# Patient Record
(demographics unavailable — no encounter records)

---

## 2024-11-04 NOTE — DISCUSSION/SUMMARY
[FreeTextEntry1] : 3 year old male with strep throat - rapid strep positive. Complete 10 days of antibiotics. Use antipyretics as needed. After being on antibiotics for at least 24 hours patient less likely to spread infection. RTO as needed Discussed side effects of antibiotics including but not limited to diarrhea

## 2024-11-04 NOTE — HISTORY OF PRESENT ILLNESS
[Fever] : FEVER [___ Day(s)] : [unfilled] day(s) [Intermittent] : intermittent [Active] : active [Sick Contacts: ___] : sick contacts: [unfilled] [Acetaminophen] : acetaminophen [Ibuprofen] : ibuprofen [Last dose: _____] : last dose: [unfilled] [Headache] : no headache [Sore Throat] : sore throat [Cough] : cough [Max Temp: ____] : Max temperature: [unfilled] [Stable] : stable

## 2025-03-08 NOTE — DEVELOPMENTAL MILESTONES
[Normal Development] : Normal Development [None] : none [Goes to the bathroom and has] : goes to bathroom and has bowel movement by self [Dresses and undresses without] : dresses and undresses without much help [Plays make-believe] : plays make-believe [Uses 4-word sentences] : uses 4-word sentences [Tells a story from a book] : tells a story from a book [Climbs stairs, alternating feet] : climbs stairs, alternating feet without support [Skips on one foot] : skips on one foot [Draws a person with head and] : draws a person with head and 3 body part [Draws a simple cross] : draws a simple cross [Unbuttons medium-sized buttons] : unbuttons medium sized buttons [Grasps a pencil with thumb and] : grasps a pencil with thumb and fingers instead of fist [Draws recognizable pictures] : draws recognizable pictures

## 2025-03-08 NOTE — HISTORY OF PRESENT ILLNESS
[Mother] : mother [Fruit] : fruit [Vegetables] : vegetables [Meat] : meat [Fish] : fish [Dairy] : dairy [Vitamin] : Patient takes vitamin daily [___ stools per day] : [unfilled]  stools per day [Toilet Trained] : toilet trained [Normal] : Normal [In own bed] : In own bed [Brushing teeth] : Brushing teeth [Yes] : Patient goes to dentist yearly [Toothpaste] : Primary Fluoride Source: Toothpaste [In Pre-K] : In Pre-K [Curiosity about body] : Curiosity about body [Playtime (60 min/d)] : Playtime 60 min a day [Appropiate parent-child communication] : Appropriate parent-child communication [Child given choices] : Child given choices [Child Cooperates] : Child cooperates [Parent has appropriate responses to behavior] : Parent has appropriate responses to behavior [No] : Not at  exposure [Water heater temperature set at <120 degrees F] : Water heater temperature set at <120 degrees F [Car seat in back seat] : Car seat in back seat [Carbon Monoxide Detectors] : Carbon monoxide detectors [Supervised outdoor play] : Supervised outdoor play [Up to date] : Up to date [FreeTextEntry7] : 4 year old for his check up [NO] : No

## 2025-03-08 NOTE — PHYSICAL EXAM

## 2025-03-08 NOTE — DISCUSSION/SUMMARY
[Normal Growth] : growth [Normal Development] : development  [No Elimination Concerns] : elimination [Continue Regimen] : feeding [No Skin Concerns] : skin [Normal Sleep Pattern] : sleep [None] : no medical problems [School Readiness] : school readiness [Healthy Personal Habits] : healthy personal habits [TV/Media] : tv/media [Child and Family Involvement] : child and family involvement [Safety] : safety [Anticipatory Guidance Given] : Anticipatory guidance addressed as per the history of present illness section [No Medications] : ~He/She~ is not on any medications [] : The components of the vaccine(s) to be administered today are listed in the plan of care. The disease(s) for which the vaccine(s) are intended to prevent and the risks have been discussed with the caretaker.  The risks are also included in the appropriate vaccination information statements which have been provided to the patient's caregiver.  The caregiver has given consent to vaccinate. [FreeTextEntry1] : Continue balanced diet with all food groups. Brush teeth twice a day with toothbrush. Recommend visit to dentist. As per car seat 's guidelines, use forward-facing booster seat until child reaches highest weight/height for seat. Put child to sleep in own bed. Help child to maintain consistent daily routines and sleep schedule. Pre-K discussed. Ensure home is safe. Teach child about personal safety. Use consistent, positive discipline. Read aloud to child. Limit screen time to no more than 2 hours per day. refer for routine labs renew saline for coughing.

## 2025-03-25 NOTE — DISCUSSION/SUMMARY
[FreeTextEntry1] : 4 yr old male with URI and cough, likely viral. Rapid flu and covid negative Recommend supportive care including antipyretics if needed, increase fluids & rest, and nasal saline. Return if symptoms worsen or persist. May use humidifier at nighttime.

## 2025-03-25 NOTE — HISTORY OF PRESENT ILLNESS
[Fever] : FEVER [Sick Contacts: ___] : sick contacts: [unfilled] [___ Day(s)] : [unfilled] day(s) [Intermittent] : intermittent [Nasal Congestion] : nasal congestion [Cough] : cough [Vomiting] : no vomiting [Diarrhea] : no diarrhea [Rash] : no rash

## 2025-06-12 NOTE — HISTORY OF PRESENT ILLNESS
[EENT/Resp Symptoms] : EENT/RESPIRATORY SYMPTOMS [Runny nose] : runny nose [Nasal congestion] : nasal congestion [___ Day(s)] : [unfilled] day(s) [Intermittent] : intermittent [Decreased appetite] : decreased appetite [Sick Contacts: ___] : no sick contacts [Clear rhinorrhea] : clear rhinorrhea [Wet cough] : wet cough [Fever] : no fever [Headache] : no headache [Change in sleep] : no change in sleep  [Eye Redness] : no eye redness [Eye Discharge] : no eye discharge [Eye Itching] : no eye itching [Ear Pain] : no ear pain [Rhinorrhea] : rhinorrhea [Nasal Congestion] : nasal congestion [Sore Throat] : sore throat [Palpitations] : no palpitations [Chest Pain] : no chest pain [Cough] : cough [Wheezing] : no wheezing [Shortness of Breath] : no shortness of breath [Tachypnea] : no tachypnea [Decreased Appetite] : decreased appetite [Posttussive emesis] : no posttussive emesis [Vomiting] : no vomiting [Diarrhea] : diarrhea [Decreased Urine Output] : no decreased urine output [Rash] : no rash [FreeTextEntry8] : no pattern  [FreeTextEntry4] : no meds given  [FreeTextEntry6] : no fevers

## 2025-06-12 NOTE — PHYSICAL EXAM
[Acute Distress] : no acute distress [Alert] : alert [Tired appearing] : not tired appearing [Lethargic] : not lethargic [Toxic] : not toxic [EOMI] : grossly EOMI [Conjuctival Injection] : no conjunctival injection [Pink Nasal Mucosa] : pink nasal mucosa [Erythematous Oropharynx] : nonerythematous oropharynx [Enlarged Tonsils] : tonsils not enlarged [Exudate] : no exudate [Palate petechiae] : palate without petechiae [Supple] : supple [Clear to Auscultation Bilaterally] : clear to auscultation bilaterally [Wheezing] : no wheezing [Rales] : no rales [Tachypnea] : no tachypnea [Rhonchi] : no rhonchi [Regular Rate and Rhythm] : regular rate and rhythm [Normal S1, S2 audible] : normal S1, S2 audible [Soft] : soft [Tender] : nontender [Distended] : nondistended [Normal Bowel Sounds] : normal bowel sounds [No Abnormal Lymph Nodes Palpated] : no abnormal lymph nodes palpated [Moves All Extremities x 4] : moves all extremities x4 [Warm, Well Perfused x4] : warm, well perfused x4 [Capillary Refill <2s] : capillary refill < 2s [Warm] : warm [Clear] : clear [FreeTextEntry1] : talkative and playful

## 2025-06-12 NOTE — DISCUSSION/SUMMARY
[FreeTextEntry1] : 3 y/o male here with likely viral syndrome, + uri sx and episode of diarrhea.   Well appearing, well hydrated, afebrile in office. Clear lungs upon auscultation. Unremarkable oropharynx and otic exam. rapid strep neg tc sent will f/u results Supportive care: Nasal saline, suction, humidifier as needed. Drink plenty of fluids. Fever reducing medications as needed. OTC cough suppressants not recommended in children under the age of 7. Can try Zarbee's or Patrick's herbal cough soothing syrup. Avoid those that have honey if under 1 year of age. Seek immediate attention for difficulty breathing, dehydration or prolonged fever (more than 5 days)/elevated fever (greater than 105). Check back if symptoms are prolonged, worsening or if new concerns arise.  bland /binding diet for diarrhea, if worsening rtc   Parent/Guardian agree with and express understanding of plan.